# Patient Record
Sex: FEMALE | Race: WHITE
[De-identification: names, ages, dates, MRNs, and addresses within clinical notes are randomized per-mention and may not be internally consistent; named-entity substitution may affect disease eponyms.]

---

## 2022-05-18 ENCOUNTER — HOSPITAL ENCOUNTER (EMERGENCY)
Dept: HOSPITAL 63 - ER | Age: 37
LOS: 1 days | Discharge: ANOTHER HOSPITAL | End: 2022-05-19
Payer: MEDICAID

## 2022-05-18 VITALS — BODY MASS INDEX: 42.76 KG/M2 | WEIGHT: 250.45 LBS | HEIGHT: 64 IN

## 2022-05-18 DIAGNOSIS — Z20.822: ICD-10-CM

## 2022-05-18 DIAGNOSIS — M54.41: ICD-10-CM

## 2022-05-18 DIAGNOSIS — M54.42: Primary | ICD-10-CM

## 2022-05-18 DIAGNOSIS — M48.061: ICD-10-CM

## 2022-05-18 LAB
ERYTHROCYTE [DISTWIDTH] IN BLOOD BY AUTOMATED COUNT: 13.8 % (ref 11.5–14.5)
HCT VFR BLD CALC: 41.3 % (ref 36–47)
HGB BLD-MCNC: 13.8 G/DL (ref 12–15.5)
MCH RBC QN AUTO: 31 PG (ref 25–35)
MCHC RBC AUTO-ENTMCNC: 34 G/DL (ref 31–37)
MCV RBC AUTO: 91 FL (ref 79–100)
PLATELET # BLD AUTO: 248 X10^3/UL (ref 140–400)
RBC # BLD AUTO: 4.54 X10^6/UL (ref 3.5–5.4)
WBC # BLD AUTO: 15.2 X10^3/UL (ref 4–11)

## 2022-05-18 PROCEDURE — 87426 SARSCOV CORONAVIRUS AG IA: CPT

## 2022-05-18 PROCEDURE — 85027 COMPLETE CBC AUTOMATED: CPT

## 2022-05-18 PROCEDURE — 80307 DRUG TEST PRSMV CHEM ANLYZR: CPT

## 2022-05-18 PROCEDURE — 36415 COLL VENOUS BLD VENIPUNCTURE: CPT

## 2022-05-18 PROCEDURE — 80048 BASIC METABOLIC PNL TOTAL CA: CPT

## 2022-05-18 PROCEDURE — 81001 URINALYSIS AUTO W/SCOPE: CPT

## 2022-05-18 PROCEDURE — 96372 THER/PROPH/DIAG INJ SC/IM: CPT

## 2022-05-18 PROCEDURE — 96374 THER/PROPH/DIAG INJ IV PUSH: CPT

## 2022-05-18 PROCEDURE — 99285 EMERGENCY DEPT VISIT HI MDM: CPT

## 2022-05-18 NOTE — PHYS DOC
Past History


Past Medical History:  No Pertinent History


Past Surgical History:  Other


Alcohol Use:  None


Drug Use:  None





General Adult


EDM:


Chief Complaint:  BACK PAIN OR INJURY





HPI:


HPI:


"... I got bad back problems.. I ve already had my CT.. and to get a MRI.... and

referral to Neurosurgery... I had surgery back 4 yrs ago.. at [facility].. but my 

doctor .. Sanya.. and to got to Emergency room if the pain got worse 

tonight..."..".. I can't ever walk to the bath room because of the pain...".. " 

It all started after I was moving a mattress the other day..."





Patient is a 36 year old female who presents with above hx and complaints of 

exacerbation of her chronic back pain.  Patient reports that symptoms started 

after attempting to move a mattress last Thursday patient reports that pain has 

gotten much worse during the interval.  Did have a CT which showed L4-L5 spinal 

stenosis with recommendation of MRI.  Patient has previously followed at [facility] for

her surgery and disc extrusion.  Patient states last disc denies section was an 

2018.  Patient currently states pain is so severe she is unable to walk.  

Patient does complain of some thigh numbness.  Does have bilateral sciatic 

complaints.  Patient normally follows with Dr. Bailon.and Dr. Segundo.  Pt. 

reports she has not been will have a stool for the last 5 days.  Patient states 

currently she is unable to make it to the bathroom or even walk because of pain.

 Patient denies any history of immunosuppression.  No fever or chills.  Patient 

rates her back pain is 10 out of 10.





Review of Systems:


Review of Systems:


Constitutional:  Denies fever or chills 


Eyes:  Denies change in visual acuity 


HENT:  Denies nasal congestion or sore throat 


Respiratory:  Denies cough or shortness of breath 


Cardiovascular:  Denies chest pain or edema 


GI:  Denies abdominal pain, nausea, vomiting, bloody stools or diarrhea 


: Denies dysuria


Musculoskeletal:  Complaints of severe back pain


Integument:  Denies rash 


Neurologic:  Denies headache, focal weakness or sensory changes 


Endocrine:  Denies polyuria or polydipsia 


Lymphatic:  Denies swollen glands 


Psychiatric:  Denies depression or anxiety





Family History:


Family History:


Noncontributory to presentation





Current Medications:


Current Meds:


See nursing for home meds





Allergies:


Allergies:





Allergies








Coded Allergies Type Severity Reaction Last Updated Verified


 


  No Known Drug Allergies    3/12/16 No











Physical Exam:


PE:





Constitutional: in acute distress, non-toxic appearance. []


HENT: Normocephalic, atraumatic, bilateral external ears normal, oropharynx 

moist, no oral exudates, nose normal. []


Eyes: PERRLA, EOMI, conjunctiva normal, no discharge. [] 


Neck: Normal range of motion, no tenderness, supple, no stridor. [] 


Cardiovascular:Heart rate regular rhythm, no murmur []


Lungs & Thorax:  Bilateral breath sounds clear to auscultation []


Abdomen: Bowel sounds normal, soft, no tenderness, no masses, no pulsatile 

masses.  Obese.


Skin: Warm, dry, no erythema, no rash. [] 


Back: No tenderness, no CVA tenderness.  Bilateral sciatica.  Old scar


Extremities: No tenderness, no cyanosis, no clubbing, ROM intact, no edema.  

Numbness and bilateral upper thighs


Neurologic: Alert and oriented X 3, complains of severe motor weakness when she 

attempts to walk.  Has distal sensory.  Complains of numbness in bilateral thigh

s DTRs are +2


Psychologic: Affect anxious l, judgement normal, mood referral





EKG:


EKG:


[]





Radiology/Procedures:


Radiology/Procedures:


*(Reviewed prior CT on her phone.- Radiology recommends MRI]





Heart Score:


C/O Chest Pain:  N/A


Risk Factors:


Risk Factors:  DM, Current or recent (<one month) smoker, HTN, HLP, family 

history of CAD, obesity.


Risk Scores:


Score 0 - 3:  2.5% MACE over next 6 weeks - Discharge Home


Score 4 - 6:  20.3% MACE over next 6 weeks - Admit for Clinical Observation


Score 7 - 10:  72.7% MACE over next 6 weeks - Early Invasive Strategies





Course & Med Decision Making:


Course & Med Decision Making


Pertinent Labs and Imaging studies reviewed. (See chart for details)





Pt. accepted at [facility]-


Possible MRI and Neuro-surgical consult.


Dr.Rao Matthews





Impression:





1. Acute Sciatica


2. L4-5 Spinal Stenosis- 


3. Pain Management





[]





Dragon Disclaimer:


Dragon Disclaimer:


This electronic medical record was generated, in whole or in part, using a voice

 recognition dictation system.





Departure


Departure:


Referrals:  


BROCKERT,SRIDEVI G MD (PCP)





Aditya Disclaimer


This chart was dictated in whole or in part using Voice Recognition software in 

a busy, high-work load, and often noisy Emergency Department environment.  It 

may contain unintended and wholly unrecognized errors or omissions.





Dragon Disclaimer


This chart was dictated in whole or in part using Voice Recognition software in 

a busy, high-work load, and often noisy Emergency Department environment.  It 

may contain unintended and wholly unrecognized errors or omissions.











MARCIE PARADA MD           May 18, 2022 21:28

## 2022-05-19 VITALS — DIASTOLIC BLOOD PRESSURE: 86 MMHG | SYSTOLIC BLOOD PRESSURE: 134 MMHG

## 2022-05-19 LAB
AMPHETAMINE/METHAMPHETAMINE: (no result)
ANION GAP SERPL CALC-SCNC: 10 MMOL/L (ref 6–14)
APTT PPP: YELLOW S
BACTERIA #/AREA URNS HPF: 0 /HPF
BARBITURATES UR-MCNC: (no result) UG/ML
BENZODIAZ UR-MCNC: (no result) UG/L
CA-I SERPL ISE-MCNC: 17 MG/DL (ref 7–20)
CALCIUM SERPL-MCNC: 8.9 MG/DL (ref 8.5–10.1)
CANNABINOIDS UR-MCNC: (no result) UG/L
CHLORIDE SERPL-SCNC: 104 MMOL/L (ref 98–107)
CO2 SERPL-SCNC: 26 MMOL/L (ref 21–32)
COCAINE UR-MCNC: (no result) NG/ML
CREAT SERPL-MCNC: 0.9 MG/DL (ref 0.6–1)
FIBRINOGEN PPP-MCNC: CLEAR MG/DL
GFR SERPLBLD BASED ON 1.73 SQ M-ARVRAT: 71.3 ML/MIN
GLUCOSE SERPL-MCNC: 95 MG/DL (ref 70–99)
GLUCOSE UR STRIP-MCNC: (no result) MG/DL
METHADONE SERPL-MCNC: (no result) NG/ML
NITRITE UR QL STRIP: (no result)
OPIATES UR-MCNC: (no result) NG/ML
PCP SERPL-MCNC: (no result) MG/DL
POTASSIUM SERPL-SCNC: 3.7 MMOL/L (ref 3.5–5.1)
RBC #/AREA URNS HPF: 0 /HPF (ref 0–2)
SODIUM SERPL-SCNC: 140 MMOL/L (ref 136–145)
SP GR UR STRIP: 1.02
SQUAMOUS #/AREA URNS LPF: (no result) /LPF
UROBILINOGEN UR-MCNC: 0.2 MG/DL
WBC #/AREA URNS HPF: (no result) /HPF (ref 0–4)

## 2022-05-29 ENCOUNTER — HOSPITAL ENCOUNTER (EMERGENCY)
Dept: HOSPITAL 63 - ER | Age: 37
Discharge: ANOTHER HOSPITAL | End: 2022-05-29
Payer: COMMERCIAL

## 2022-05-29 VITALS — BODY MASS INDEX: 41.33 KG/M2 | WEIGHT: 242.07 LBS | HEIGHT: 64 IN

## 2022-05-29 VITALS — DIASTOLIC BLOOD PRESSURE: 59 MMHG | SYSTOLIC BLOOD PRESSURE: 112 MMHG
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
## 2022-05-29 DIAGNOSIS — R55: ICD-10-CM

## 2022-05-29 DIAGNOSIS — R07.2: ICD-10-CM
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
## 2022-05-29 DIAGNOSIS — S00.83XA: Primary | ICD-10-CM

## 2022-05-29 DIAGNOSIS — I26.02: ICD-10-CM
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
## 2022-05-29 DIAGNOSIS — Z20.822: ICD-10-CM

## 2022-05-29 LAB
ALBUMIN SERPL-MCNC: 3.9 G/DL (ref 3.4–5)
ALBUMIN/GLOB SERPL: 1 {RATIO} (ref 1–1.7)
ALP SERPL-CCNC: 85 U/L (ref 46–116)
ALT SERPL-CCNC: 46 U/L (ref 14–59)
ANION GAP SERPL CALC-SCNC: 12 MMOL/L (ref 6–14)
AST SERPL-CCNC: 25 U/L (ref 15–37)
BASOPHILS # BLD AUTO: 0.1 X10^3/UL (ref 0–0.2)
BASOPHILS NFR BLD: 1 % (ref 0–3)
BILIRUB SERPL-MCNC: 0.4 MG/DL (ref 0.2–1)
BUN/CREAT SERPL: 17 (ref 6–20)
CA-I SERPL ISE-MCNC: 19 MG/DL (ref 7–20)
CALCIUM SERPL-MCNC: 9.8 MG/DL (ref 8.5–10.1)
CHLORIDE SERPL-SCNC: 102 MMOL/L (ref 98–107)
CO2 SERPL-SCNC: 26 MMOL/L (ref 21–32)
CREAT SERPL-MCNC: 1.1 MG/DL (ref 0.6–1)
EOSINOPHIL NFR BLD: 0.3 X10^3/UL (ref 0–0.7)
EOSINOPHIL NFR BLD: 2 % (ref 0–3)
ERYTHROCYTE [DISTWIDTH] IN BLOOD BY AUTOMATED COUNT: 14.4 % (ref 11.5–14.5)
GFR SERPLBLD BASED ON 1.73 SQ M-ARVRAT: 56.5 ML/MIN
GLOBULIN SER-MCNC: 4 G/DL (ref 2.2–3.8)
GLUCOSE SERPL-MCNC: 106 MG/DL (ref 70–99)
HCT VFR BLD CALC: 44 % (ref 36–47)
HGB BLD-MCNC: 15 G/DL (ref 12–15.5)
LYMPHOCYTES # BLD: 2 X10^3/UL (ref 1–4.8)
LYMPHOCYTES NFR BLD AUTO: 19 % (ref 24–48)
MCH RBC QN AUTO: 31 PG (ref 25–35)
MCHC RBC AUTO-ENTMCNC: 34 G/DL (ref 31–37)
MCV RBC AUTO: 91 FL (ref 79–100)
MONO #: 0.6 X10^3/UL (ref 0–1.1)
MONOCYTES NFR BLD: 6 % (ref 0–9)
NEUT #: 7.5 X10^3UL (ref 1.8–7.7)
NEUTROPHILS NFR BLD AUTO: 72 % (ref 31–73)
PLATELET # BLD AUTO: 250 X10^3/UL (ref 140–400)
POTASSIUM SERPL-SCNC: 3.9 MMOL/L (ref 3.5–5.1)
PROT SERPL-MCNC: 7.9 G/DL (ref 6.4–8.2)
RBC # BLD AUTO: 4.82 X10^6/UL (ref 3.5–5.4)
SODIUM SERPL-SCNC: 140 MMOL/L (ref 136–145)
WBC # BLD AUTO: 10.4 X10^3/UL (ref 4–11)

## 2022-05-29 PROCEDURE — 87426 SARSCOV CORONAVIRUS AG IA: CPT

## 2022-05-29 PROCEDURE — 85379 FIBRIN DEGRADATION QUANT: CPT

## 2022-05-29 PROCEDURE — 96374 THER/PROPH/DIAG INJ IV PUSH: CPT

## 2022-05-29 PROCEDURE — 80053 COMPREHEN METABOLIC PANEL: CPT

## 2022-05-29 PROCEDURE — 99285 EMERGENCY DEPT VISIT HI MDM: CPT

## 2022-05-29 PROCEDURE — C9803 HOPD COVID-19 SPEC COLLECT: HCPCS

## 2022-05-29 PROCEDURE — U0003 INFECTIOUS AGENT DETECTION BY NUCLEIC ACID (DNA OR RNA); SEVERE ACUTE RESPIRATORY SYNDROME CORONAVIRUS 2 (SARS-COV-2) (CORONAVIRUS DISEASE [COVID-19]), AMPLIFIED PROBE TECHNIQUE, MAKING USE OF HIGH THROUGHPUT TECHNOLOGIES AS DESCRIBED BY CMS-2020-01-R: HCPCS

## 2022-05-29 PROCEDURE — 84484 ASSAY OF TROPONIN QUANT: CPT

## 2022-05-29 PROCEDURE — 93005 ELECTROCARDIOGRAM TRACING: CPT

## 2022-05-29 PROCEDURE — 36415 COLL VENOUS BLD VENIPUNCTURE: CPT

## 2022-05-29 PROCEDURE — 85025 COMPLETE CBC W/AUTO DIFF WBC: CPT

## 2022-05-29 PROCEDURE — 96361 HYDRATE IV INFUSION ADD-ON: CPT

## 2022-05-29 PROCEDURE — 71275 CT ANGIOGRAPHY CHEST: CPT

## 2022-05-29 PROCEDURE — 71045 X-RAY EXAM CHEST 1 VIEW: CPT

## 2022-05-29 NOTE — RAD
CTA CHEST



History: Chest pain, short of air, tachycardia, recent surgery.



Comparison: None.



Technique: CTA of the pulmonary arteries with intravenous contrast. 3-D postprocessing was performed.
 



Findings:

Pulmonary arteries: There are 2 large linear saddle emboli extending into the bilateral main pulmonar
y arteries, involving each of the lobar pulmonary arteries and numerous segmental branches and distal
ly.

Aorta and great vessels: No aneurysm or dissection of the aortic arch or thoracic aorta.

Thyroid: No significant abnormalities.

Mediastinum and david: No mediastinal masses or adenopathy is seen.

Esophagus: The visualized esophagus is normal.

Heart: The heart is normal in size. There is no pericardial effusion. There is leftward bowing of the
 intraventricular septum.

Airways, Lungs, Pleura: The airways are patent. No pleural effusion or pneumothorax. No airspace cons
olidation.

Upper abdomen: Cholelithiasis. Postsurgical changes of the stomach partially visualized.

Osseous structures and soft tissues: Within normal limits for age.



Impression: 



1.  Acute saddle pulmonary emboli involving the bilateral main pulmonary arteries, lobar pulmonary ar
teries and numerous segmental branches and distally. Right heart strain.

2.  Cholelithiasis.





Findings discussed with Stephen Wong at 5/29/2022 5:19 PM.



**********FOR INTERNAL CODING PURPOSES**********



RESULT CODE: (C)  



  









------

Exposure: One or more of the following individualized dose reduction techniques were utilized for thi
s examination:  

1. Automated exposure control

2. Adjustment of the mA and/or kV according to patient size

3. Use of iterative reconstruction technique.



Electronically signed by: Dion Mac MD (5/29/2022 5:32 PM) [place] [id number]

## 2022-05-29 NOTE — PHYS DOC
Past History


Past Medical History:  No Pertinent History


Additional Past Medical Histor:  back pain l4, l5


 (LANDRY CHOUDHURY)


Past Surgical History:  Other


Additional Past Surgical Histo:  gastric sleeve, l4,l5


 (LANDRY CHOUDHURY)


Alcohol Use:  None


Drug Use:  None


 (LANDRY CHOUDHURY)





General Adult


EDM:


Chief Complaint:  MECHANICAL FALL





HPI:


HPI:


Patient is a 36-year-old female who presents to the emergency department for 

multiple complaints.  


Patient had an L4/L5 disc ectomy at [facility] by Dr. Pierre 1.5 weeks ago.  She reports

on Friday she was ambulating with her cane and felt dizzy and had a syncopal 

episode and fell.  Patient is noted to have a bruise to her right cheek.  

Patient reports on Saturday she started experiencing sternal/midsternal chest 

pain with shortness of breath.  She reports that the chest pain is worse with 

movement and she has increased shortness of breath with exertion.  She is also 

reporting swelling to her left lower leg.  Currently she denies having any chest

pain.  She is taking Gabapentin at home for symptoms.


 (LANDRY CHOUDHURY)





Review of Systems:


Review of Systems:





HENT:  see HPI


Respiratory: see HPI


Cardiovascular:  see HPI


Musculoskeletal:   see HPI


Integument:  see HPI


Neurologic:  see HPI


 (LANDRY CHOUDHURY)





Current Medications:


Current Meds:





Current Medications








 Medications


  (Trade)  Dose


 Ordered  Sig/Leah  Start Time


 Stop Time Status Last Admin


Dose Admin


 


 Sodium Chloride  1,000 ml @ 


 1,000 mls/hr  1X  ONCE  5/29/22 16:15


 5/29/22 17:14 UNV  











 (LANDRY CHOUDHURY)





Allergies:


Allergies:





Allergies








Coded Allergies Type Severity Reaction Last Updated Verified


 


  No Known Drug Allergies    3/12/16 No








 (LANDRY CHOUDHURY)





Physical Exam:


PE:





Constitutional: Well developed, well nourished, no acute distress, pale 

appearing, non-toxic appearance. []


HENT: Normocephalic, ecchymosis below right eye, bilateral external ears normal,

 oropharynx moist, no oral exudates, nose normal. []


Eyes: PERRL, EOMI, conjunctiva normal, no discharge. [] 


Neck: Normal range of motion, no tenderness, supple, no stridor. [] 


Cardiovascular:Heart rate tachycardic rhythm, no murmur []


Lungs & Thorax:  Bilateral breath sounds clear to auscultation []


Abdomen: Bowel sounds normal, soft, no tenderness, no masses, no pulsatile 

masses. [] 


Skin: Warm, dry, no erythema, no rash. [] 


Back: No tenderness, normal range of motion


Extremities: No tenderness, no cyanosis, no clubbing, ROM intact, 1+ edema noted

 to left lower extremity, no edema noted to right lower extremity


Neurologic: Alert and oriented X 3, normal motor function, normal sensory 

function, no focal deficits noted. []


Psychologic: Affect normal, judgement normal, mood normal. []


 (LANDRY CHOUDHURY APRN)





Current Patient Data:


Labs:





Laboratory Tests








Test


 5/29/22


16:20


 


White Blood Count 10.4 x10^3/uL 


 


Red Blood Count 4.82 x10^6/uL 


 


Hemoglobin 15.0 g/dL 


 


Hematocrit 44.0 % 


 


Mean Corpuscular Volume 91 fL 


 


Mean Corpuscular Hemoglobin 31 pg 


 


Mean Corpuscular Hemoglobin


Concent 34 g/dL 





 


Red Cell Distribution Width 14.4 % 


 


Platelet Count 250 x10^3/uL 


 


Neutrophils (%) (Auto) 72 % 


 


Lymphocytes (%) (Auto) 19 % 


 


Monocytes (%) (Auto) 6 % 


 


Eosinophils (%) (Auto) 2 % 


 


Basophils (%) (Auto) 1 % 


 


Neutrophils # (Auto) 7.5 x10^3uL 


 


Lymphocytes # (Auto) 2.0 x10^3/uL 


 


Monocytes # (Auto) 0.6 x10^3/uL 


 


Eosinophils # (Auto) 0.3 x10^3/uL 


 


Basophils # (Auto) 0.1 x10^3/uL 


 


D-Dimer (Nelda) 13.96 mg/L 


 


Sodium Level 140 mmol/L 


 


Potassium Level 3.9 mmol/L 


 


Chloride Level 102 mmol/L 


 


Carbon Dioxide Level 26 mmol/L 


 


Anion Gap 12 


 


Blood Urea Nitrogen 19 mg/dL 


 


Creatinine 1.1 mg/dL 


 


Estimated GFR


(Cockcroft-Gault) 56.5 





 


BUN/Creatinine Ratio 17 


 


Glucose Level 106 mg/dL 


 


Calcium Level 9.8 mg/dL 


 


Total Bilirubin 0.4 mg/dL 


 


Aspartate Amino Transf


(AST/SGOT) 25 U/L 





 


Alanine Aminotransferase


(ALT/SGPT) 46 U/L 





 


Alkaline Phosphatase 85 U/L 


 


Troponin I High Sensitivity 374 ng/L 


 


Total Protein 7.9 g/dL 


 


Albumin 3.9 g/dL 


 


Albumin/Globulin Ratio 1.0 








Current Medications








 Medications


  (Trade)  Dose


 Ordered  Sig/Leah


 Route


 PRN Reason  Start Time


 Stop Time Status Last Admin


Dose Admin


 


 Sodium Chloride  1,000 ml @ 


 1,000 mls/hr  1X  ONCE


 IV


   5/29/22 16:15


 5/29/22 17:14 DC 5/29/22 17:00





 


 Iohexol


  (Omnipaque 350


 Mg/ml)  100 ml  1X  ONCE


 IV


   5/29/22 16:45


 5/29/22 16:46 DC 5/29/22 16:49





 


 Heparin Sodium/


 Dextrose  250 ml @ 


 17.5 mls/hr  CONT  PRN


 IV


 SEE I/O RECORD  5/29/22 17:15


     





 


 Heparin Sodium


  (Porcine)


  (Heparin Sodium)  8,800 unit  1X  ONCE


 IV


   5/29/22 17:15


 5/29/22 17:22 DC 5/29/22 17:18





 


 Heparin Sodium


  (Porcine)


  (Heparin Sodium)  3,300 unit  PRN Q6HRS  PRN


 IV


 FOR PTT LESS THAN 24 SECONDS  5/29/22 17:15


     





 


 Heparin Sodium


  (Porcine)


  (Heparin Sodium)  2,000 unit  PRN Q6HRS  PRN


 IV


 FOR PTT LEVEL 24 - 27 SECONDS  5/29/22 17:15


     





 


 Heparin Sodium


  (Porcine)


  (Heparin Sodium)  1,000 unit  PRN Q6HRS  PRN


 IV


 FOR PTT LEVEL 28 - 37 SECONDS  5/29/22 17:15


     











 (LANDRY CHODUHURY)





EKG:


EKG:


EKG performed by ER staff at 1613 shows sinus tachycardia with a rate of 132 

patient does have T wave inversions in lead III and aVF read by Dr. Fung []


 (LANDRY CHOUDHURY)





Radiology/Procedures:


Radiology/Procedures:


[]


XR CHEST 1V





History: Reason: weakness / Spl. Instructions:  / History: 





STATUS: REG ER            ORD. PHYSICIAN: LANDRY CHOUDHURY


REASON: syncope


PROCEDURE: PORTABLE CHEST 1V





XR CHEST 1V





History: Reason: syncope / Spl. Instructions: Nurse did not take the bra off / 

History: 





Comparison: March 12, 2016





Findings:


No consolidation or pleural effusion. Normal heart size. No pneumothorax.





Impression: 


1.  No acute cardiopulmonary process.





Electronically signed by: Cosme Lawson DO (5/29/2022 5:24 PM) [facility]














DICTATED AND SIGNED BY:     COSME LAWSON DO


DATE:     05/29/22 1721





CC: LANDRY CHOUDHURY; PATRICIO MARTIN ~


PROCEDURE: CT ANGIOGRAPHY CHEST





CTA CHEST





History: Chest pain, short of air, tachycardia, recent surgery.





Comparison: None.





Technique: CTA of the pulmonary arteries with intravenous contrast. 3-D 

postprocessing was performed. 





Findings:


Pulmonary arteries: There are 2 large linear saddle emboli extending into the 

bilateral main pulmonary arteries, involving each of the lobar pulmonary 

arteries and numerous segmental branches and distally.


Aorta and great vessels: No aneurysm or dissection of the aortic arch or 

thoracic aorta.


Thyroid: No significant abnormalities.


Mediastinum and david: No mediastinal masses or adenopathy is seen.


Esophagus: The visualized esophagus is normal.


Heart: The heart is normal in size. There is no pericardial effusion. There is 

leftward bowing of the intraventricular septum.


Airways, Lungs, Pleura: The airways are patent. No pleural effusion or 

pneumothorax. No airspace consolidation.


Upper abdomen: Cholelithiasis. Postsurgical changes of the stomach partially 

visualized.


Osseous structures and soft tissues: Within normal limits for age.





Impression: 





1.  Acute saddle pulmonary emboli involving the bilateral main pulmonary 

arteries, lobar pulmonary arteries and numerous segmental branches and distally.

 Right heart strain.


2.  Cholelithiasis.








Findings discussed with Oumar Fung at 5/29/2022 5:19 PM.





**********FOR INTERNAL CODING PURPOSES**********





RESULT CODE: (C)  





  














------


Exposure: One or more of the following individualized dose reduction techniques 

were utilized for this examination:  


1. Automated exposure control


2. Adjustment of the mA and/or kV according to patient size


3. Use of iterative reconstruction technique.





Electronically signed by: Doin Burger MD (5/29/2022 5:32 PM) [id number]














DICTATED AND SIGNED BY:     DION BURGER MD


DATE:     05/29/22 1715





CC: LANDRY CHOUDHURY; PATRICIO MARTIN ~


 (LANDRY CHOUDHURY)





Heart Score:


C/O Chest Pain:  Yes


HEART Score for Chest Pain:  








HEART Score for Chest Pain Response (Comments) Value


 


History Moderately Suspicious 1


 


ECG Nonspecific Repolarizatio 1


 


Age < 45 0


 


Risk Factors No Risk Factors 0


 


Troponin >3 x Normal Limit 2


 


Total  4








Risk Factors:


Risk Factors:  DM, Current or recent (<one month) smoker, HTN, HLP, family 

history of CAD, obesity.


Risk Scores:


Score 0 - 3:  2.5% MACE over next 6 weeks - Discharge Home


Score 4 - 6:  20.3% MACE over next 6 weeks - Admit for Clinical Observation


Score 7 - 10:  72.7% MACE over next 6 weeks - Early Invasive Strategies


 (LANDRY CHOUDHURY)





Course & Med Decision Making:


Course & Med Decision Making


Pertinent Labs and Imaging studies reviewed. (See chart for details)





[]Patient had an L4/L5 disc ectomy at [facility] by Dr. Pierre 1.5 weeks ago.  She 

reports on Friday she was ambulating with her cane and felt dizzy and had a 

syncopal episode and fell.  Patient is noted to have a bruise to her right 

cheek.  Patient reports on Saturday she started experiencing sternal/midsternal 

chest pain with shortness of breath.  She reports that the chest pain is worse 

with movement and she has increased shortness of breath with exertion.  She is 

also reporting swelling to her left lower leg.  Currently she denies having any 

chest pain. Patient was given a liter of IV fluids


Patient CBC was unremarkable.  She had an elevated D-dimer at 13.96, troponin 

was elevated at 374.  Chest x-ray did not show any acute findings.  Patient did 

have a CT angio of her chest performed and the radiologist read patient is 

having multiple saddle pulmonary embolisms with evidence of right heart strain. 

 He advised that patient may need intra-arterial tPA or clot retrieval.


1700: As patient is coming back into ER room from CT she does have a syncopal 

episode while in the ER cot.  CT head ordered.


Patient was started on heparin drip and heparin protocol. 


I contacted [facility] for transport of patient and they reported that 

due to the contrast shortage they are unable to accept the patient.  Dr Fung 

contacted [facility] and [facility] for transport.  He obtained acceptance at [facility] on the [address] for patient.  Patient transported via [facility] 

transportation.


 (LANDRY CHOUDHURY)





Dragon Disclaimer:


Dragon Disclaimer:


This electronic medical record was generated, in whole or in part, using a voice

 recognition dictation system.


 (LANDRY CHOUDHURY)





Departure


Departure:


Impression:  


   Primary Impression:  


   Saddle pulmonary embolus


   Qualified Codes:  I26.02 - Saddle embolus of pulmonary artery with acute cor 

   pulmonale


Disposition:  [facility]


Condition:  GUARDED


Referrals:  


PATRICIO MARTIN (PCP)





Attending Co-Sign


Attending Co-Sign


The patient was seen and interviewed as well as examined at the bedside. The kalpesh

rt was reviewed.  I actively participated in the care of this patient in the 

emergency department.  The patient was found to have saddle pulmonary emboli 

with evidence of right heart strain on CT imaging.  The patient was started on 

IV heparin for anticoagulation and continued on IV fluids.  Initial attempt was 

made to contact [facility] regarding transfer due to patient's need for 

intra-arterial tPA and/or clot retrieval given elevated troponin level and 

evidence of right heart strain on CT imaging.  Due to global IV contrast 

shortage, the patient unfortunately was unable to be accepted at [facility] as they did not have IV contrast material to be able to administer the 

procedure.  We then contacted [facility].  After speaking with Dr. Lyon, transfer physician, and Dr. Patiño, interventional radiologist, the 

patient was accepted to be transferred for further care.  The patient's vital 

signs remained significantly abnormal with a heart rate of 140 and a blood 

pressure that continues to stay at approximately 100-110 systolic.  [facility] is in [facility] and is approximately 45 to 50 minutes away 

by ground ambulance.  There is high concern for circulatory collapse in this 

patient and thus I do feel that the patient is a candidate for transfer by 

helicopter as opposed to ambulance.  Patient thus was activated for transfer by 

[facility] helicopter.  I spoke with patient and family regarding plan of care 

and they are all in agreement at time of disposition.





Critical care time excluding procedures: 60 minutes


 (OUMAR FUNG MD)


Attending Co-Sign


The patient was seen and interviewed as well as examined at the bedside. The 

chart was reviewed. The case was discussed. Agree with the plan of care.


 (LANDRY CHOUDHURY)











LANDRY CHOUDHURY          May 29, 2022 16:34


OUMAR FUNG MD               May 29, 2022 18:41

## 2022-05-29 NOTE — RAD
XR CHEST 1V



History: Reason: syncope / Spl. Instructions: Nurse did not take the bra off / History: 



Comparison: March 12, 2016



Findings:

No consolidation or pleural effusion. Normal heart size. No pneumothorax.



Impression: 

1.  No acute cardiopulmonary process.



Electronically signed by: Cosme Lawson DO (5/29/2022 5:24 PM) [facility]